# Patient Record
Sex: FEMALE | Race: WHITE | Employment: PART TIME | URBAN - NONMETROPOLITAN AREA
[De-identification: names, ages, dates, MRNs, and addresses within clinical notes are randomized per-mention and may not be internally consistent; named-entity substitution may affect disease eponyms.]

---

## 2018-05-20 ENCOUNTER — OFFICE VISIT (OUTPATIENT)
Dept: PRIMARY CARE CLINIC | Age: 48
End: 2018-05-20
Payer: COMMERCIAL

## 2018-05-20 VITALS
SYSTOLIC BLOOD PRESSURE: 120 MMHG | HEIGHT: 61 IN | WEIGHT: 127 LBS | RESPIRATION RATE: 16 BRPM | OXYGEN SATURATION: 98 % | HEART RATE: 90 BPM | TEMPERATURE: 100.3 F | BODY MASS INDEX: 23.98 KG/M2 | DIASTOLIC BLOOD PRESSURE: 72 MMHG

## 2018-05-20 DIAGNOSIS — J01.40 ACUTE NON-RECURRENT PANSINUSITIS: Primary | ICD-10-CM

## 2018-05-20 PROCEDURE — 99214 OFFICE O/P EST MOD 30 MIN: CPT | Performed by: FAMILY MEDICINE

## 2018-05-20 RX ORDER — AMOXICILLIN AND CLAVULANATE POTASSIUM 500; 125 MG/1; MG/1
1 TABLET, FILM COATED ORAL 2 TIMES DAILY
Qty: 20 TABLET | Refills: 0 | Status: SHIPPED | OUTPATIENT
Start: 2018-05-20 | End: 2018-05-30

## 2018-05-20 ASSESSMENT — ENCOUNTER SYMPTOMS
VOMITING: 0
ABDOMINAL PAIN: 0
SINUS PRESSURE: 1
WHEEZING: 0
DIARRHEA: 0
SHORTNESS OF BREATH: 0
RHINORRHEA: 1
SORE THROAT: 0
SINUS PAIN: 1
NAUSEA: 0
COUGH: 1
CONSTIPATION: 0
EYE DISCHARGE: 0
EYE REDNESS: 0
SWOLLEN GLANDS: 1
TROUBLE SWALLOWING: 0
CHANGE IN BOWEL HABIT: 1

## 2018-05-20 ASSESSMENT — PATIENT HEALTH QUESTIONNAIRE - PHQ9
SUM OF ALL RESPONSES TO PHQ9 QUESTIONS 1 & 2: 0
2. FEELING DOWN, DEPRESSED OR HOPELESS: 0
1. LITTLE INTEREST OR PLEASURE IN DOING THINGS: 0
SUM OF ALL RESPONSES TO PHQ QUESTIONS 1-9: 0

## 2018-12-26 ENCOUNTER — OFFICE VISIT (OUTPATIENT)
Dept: PRIMARY CARE CLINIC | Age: 48
End: 2018-12-26
Payer: COMMERCIAL

## 2018-12-26 VITALS
DIASTOLIC BLOOD PRESSURE: 82 MMHG | TEMPERATURE: 98.3 F | WEIGHT: 127.8 LBS | OXYGEN SATURATION: 99 % | HEART RATE: 76 BPM | BODY MASS INDEX: 24.13 KG/M2 | HEIGHT: 61 IN | RESPIRATION RATE: 16 BRPM | SYSTOLIC BLOOD PRESSURE: 116 MMHG

## 2018-12-26 DIAGNOSIS — J01.40 ACUTE PANSINUSITIS, RECURRENCE NOT SPECIFIED: Primary | ICD-10-CM

## 2018-12-26 PROCEDURE — 99214 OFFICE O/P EST MOD 30 MIN: CPT | Performed by: FAMILY MEDICINE

## 2018-12-26 RX ORDER — AMOXICILLIN 875 MG/1
875 TABLET, COATED ORAL 2 TIMES DAILY
Qty: 20 TABLET | Refills: 0 | Status: SHIPPED | OUTPATIENT
Start: 2018-12-26 | End: 2019-01-05

## 2018-12-26 ASSESSMENT — PATIENT HEALTH QUESTIONNAIRE - PHQ9
SUM OF ALL RESPONSES TO PHQ QUESTIONS 1-9: 0
SUM OF ALL RESPONSES TO PHQ9 QUESTIONS 1 & 2: 0
SUM OF ALL RESPONSES TO PHQ QUESTIONS 1-9: 0
1. LITTLE INTEREST OR PLEASURE IN DOING THINGS: 0
2. FEELING DOWN, DEPRESSED OR HOPELESS: 0

## 2022-06-23 ENCOUNTER — OFFICE VISIT (OUTPATIENT)
Dept: FAMILY MEDICINE CLINIC | Age: 52
End: 2022-06-23
Payer: COMMERCIAL

## 2022-06-23 VITALS
RESPIRATION RATE: 16 BRPM | DIASTOLIC BLOOD PRESSURE: 80 MMHG | HEIGHT: 62 IN | HEART RATE: 72 BPM | OXYGEN SATURATION: 98 % | WEIGHT: 128 LBS | TEMPERATURE: 98.4 F | SYSTOLIC BLOOD PRESSURE: 114 MMHG | BODY MASS INDEX: 23.55 KG/M2

## 2022-06-23 DIAGNOSIS — N93.9 VAGINAL BLEEDING: Primary | ICD-10-CM

## 2022-06-23 DIAGNOSIS — Z12.31 ENCOUNTER FOR SCREENING MAMMOGRAM FOR MALIGNANT NEOPLASM OF BREAST: ICD-10-CM

## 2022-06-23 DIAGNOSIS — Z00.00 WELLNESS EXAMINATION: ICD-10-CM

## 2022-06-23 DIAGNOSIS — Z11.4 ENCOUNTER FOR SCREENING FOR HIV: ICD-10-CM

## 2022-06-23 DIAGNOSIS — Z11.59 ENCOUNTER FOR HEPATITIS C SCREENING TEST FOR LOW RISK PATIENT: ICD-10-CM

## 2022-06-23 DIAGNOSIS — Z12.11 SCREEN FOR COLON CANCER: ICD-10-CM

## 2022-06-23 DIAGNOSIS — B35.4 TINEA CORPORIS: ICD-10-CM

## 2022-06-23 DIAGNOSIS — Z13.220 ENCOUNTER FOR SCREENING FOR LIPOID DISORDERS: ICD-10-CM

## 2022-06-23 PROCEDURE — 99204 OFFICE O/P NEW MOD 45 MIN: CPT | Performed by: FAMILY MEDICINE

## 2022-06-23 RX ORDER — KETOCONAZOLE 20 MG/G
CREAM TOPICAL
Qty: 30 G | Refills: 1 | Status: SHIPPED | OUTPATIENT
Start: 2022-06-23 | End: 2022-10-25 | Stop reason: SDUPTHER

## 2022-06-23 RX ORDER — AMOXICILLIN 500 MG
2 CAPSULE ORAL DAILY
COMMUNITY

## 2022-06-23 SDOH — ECONOMIC STABILITY: FOOD INSECURITY: WITHIN THE PAST 12 MONTHS, THE FOOD YOU BOUGHT JUST DIDN'T LAST AND YOU DIDN'T HAVE MONEY TO GET MORE.: NEVER TRUE

## 2022-06-23 SDOH — ECONOMIC STABILITY: FOOD INSECURITY: WITHIN THE PAST 12 MONTHS, YOU WORRIED THAT YOUR FOOD WOULD RUN OUT BEFORE YOU GOT MONEY TO BUY MORE.: NEVER TRUE

## 2022-06-23 ASSESSMENT — PATIENT HEALTH QUESTIONNAIRE - PHQ9
SUM OF ALL RESPONSES TO PHQ QUESTIONS 1-9: 0
SUM OF ALL RESPONSES TO PHQ9 QUESTIONS 1 & 2: 0
1. LITTLE INTEREST OR PLEASURE IN DOING THINGS: 0
2. FEELING DOWN, DEPRESSED OR HOPELESS: 0
SUM OF ALL RESPONSES TO PHQ QUESTIONS 1-9: 0

## 2022-06-23 ASSESSMENT — ENCOUNTER SYMPTOMS
ABDOMINAL PAIN: 0
CHEST TIGHTNESS: 0
PHOTOPHOBIA: 0
SHORTNESS OF BREATH: 0
WHEEZING: 0
COUGH: 0
BACK PAIN: 0
CHOKING: 0

## 2022-06-23 ASSESSMENT — SOCIAL DETERMINANTS OF HEALTH (SDOH): HOW HARD IS IT FOR YOU TO PAY FOR THE VERY BASICS LIKE FOOD, HOUSING, MEDICAL CARE, AND HEATING?: NOT HARD AT ALL

## 2022-06-23 NOTE — PROGRESS NOTES
Name: Radha Estevez  DOS: 6/24/2022  MRN: [id number]      Subjective:  Radha Estevez is a 47 y.o. female being seen for   Chief Complaint   Patient presents with    New Patient    Establish Care       Vitals:    06/23/22 1559   BP: 114/80   Pulse: 72   Resp: 16   Temp: 98.4 °F (36.9 °C)   SpO2: 98%     Allergies   Allergen Reactions    Gluten Meal Other (See Comments)     Gas and bloating.  Molds & Smuts Other (See Comments)     Watering eyes and stuffy nose.  Mucinex [Guaifenesin Er] Other (See Comments)     Made kidney area tender painful    Pollen Extract Other (See Comments)     Watering eyes and stuffy nose. Past Medical History:   Diagnosis Date    History of UTI 8/29/2015    Other plastic surgery for unacceptable cosmetic appearance 4/7/2014    Bilateral otoplasty and rhinoplasty. Dr. Cheema Police.  Shortness of breath on exertion 2014    Patient states she will \"feel a little dizzy at the top of the stairs. \"    Sinus drainage 07/27/2016    Sinus infection 8/8/2015    per patient    Wears glasses      Past Surgical History:   Procedure Laterality Date    HYSTERECTOMY  July 2012    Vaginal    OTOPLASTY Bilateral 4/7/2014    Bilateral otoplasty and rhinoplasty. Dr. Cheema Police.  OTOPLASTY Right 9/10/2015    Revision of right otoplasty. Dr. Cheema Police.  OTOPLASTY Right 5/2/2016    Second revision of right otoplasty. Dr. Cheema Police.  RHINOPLASTY  04/07/2014    Rhinoplasty and bilateral otoplasty. Dr. Cheema Police. Social History     Socioeconomic History    Marital status:      Spouse name: Not on file    Number of children: Not on file    Years of education: Not on file    Highest education level: Not on file   Occupational History    Not on file   Tobacco Use    Smoking status: Never Smoker    Smokeless tobacco: Never Used   Substance and Sexual Activity    Alcohol use:  Yes     Alcohol/week: 0.0 standard drinks     Comment: \"Occasionally, like at [place] time or on Payne's Day. \"    Drug use: No    Sexual activity: Yes     Partners: Male   Other Topics Concern    Not on file   Social History Narrative    Not on file     Social Determinants of Health     Financial Resource Strain: Low Risk     Difficulty of Paying Living Expenses: Not hard at all   Food Insecurity: No Food Insecurity    Worried About Running Out of Food in the Last Year: Never true    [address] in the Last Year: Never true   Transportation Needs:     Lack of Transportation (Medical): Not on file    Lack of Transportation (Non-Medical): Not on file   Physical Activity:     Days of Exercise per Week: Not on file    Minutes of Exercise per Session: Not on file   Stress:     Feeling of Stress : Not on file   Social Connections:     Frequency of Communication with Friends and Family: Not on file    Frequency of Social Gatherings with Friends and Family: Not on file    Attends [employer] Services: Not on file    Active Member of [address] [place] [address] or Organizations: Not on file    Attends Club or Organization Meetings: Not on file    Marital Status: Not on file   Intimate Partner Violence:     Fear of Current or Ex-Partner: Not on file    Emotionally Abused: Not on file    Physically Abused: Not on file    Sexually Abused: Not on file   Housing Stability:     Unable to Pay for Housing in the Last Year: Not on file    Number of [place] in the Last Year: Not on file    Unstable Housing in the Last Year: Not on file       Current Outpatient Medications   Medication Sig Dispense Refill    Omega-3 Fatty Acids (FISH OIL) 1200 MG CAPS Take 2 mg by mouth daily      ketoconazole (NIZORAL) 2 % cream Apply topically three times a day for 7-10 days 30 g 1    GARLIC PO Take 1 capsule by mouth daily      Multiple Vitamins-Minerals (THERAPEUTIC MULTIVITAMIN-MINERALS) tablet Take 1 tablet by mouth daily \"Doctors' Choice\"      POTASSIUM CHLORIDE ER PO Take 1 capsule by mouth daily.       Ascorbic Acid (VITAMIN C) 500 MG tablet Take 500 mg by mouth daily.  Boswellia Nadia (BOSWELLIA PO) Take 1 capsule by mouth daily DURING WINTER ONLY. (Patient not taking: Reported on 6/23/2022)      fluticasone (FLONASE) 50 MCG/ACT nasal spray 1 spray by Nasal route 2 times daily as needed  (Patient not taking: Reported on 6/23/2022)       No current facility-administered medications for this visit. Objective:  Pt is here with her  to be established. Feels good today. Also she says she she has irritation of her \"belly button\" she places antifungal over the counter helps then comes back. Also upon further questioning she gets very small, light vaginal bleeding\" just after intercourse. Review of Systems   Constitutional: Positive for fatigue (pt gets tired during the day takes naps and gets tired at the end of the day). Negative for unexpected weight change. Eyes: Negative for photophobia and visual disturbance. Respiratory: Negative for cough, choking, chest tightness, shortness of breath and wheezing. Cardiovascular: Negative for chest pain, palpitations and leg swelling. Gastrointestinal: Negative for abdominal pain. Genitourinary: Positive for vaginal bleeding (a little very light blood after intercourse). Negative for difficulty urinating, hematuria, vaginal discharge and vaginal pain. Musculoskeletal: Negative for arthralgias, back pain and myalgias. Skin: Positive for rash (red irritation inside \"belly button\"). Negative for wound. Neurological: Negative for dizziness, tremors, seizures, syncope, speech difficulty, weakness, light-headedness, numbness and headaches. Hematological: Negative for adenopathy. Does not bruise/bleed easily. Psychiatric/Behavioral: Negative for agitation, confusion, decreased concentration, self-injury, sleep disturbance and suicidal ideas. The patient is not nervous/anxious.       Physical Exam  Constitutional:       General: She is not in acute distress. Appearance: Normal appearance. She is not ill-appearing, toxic-appearing or diaphoretic. Comments: Flat affect   HENT:      Head: Normocephalic and atraumatic. Right Ear: Tympanic membrane, ear canal and external ear normal. There is no impacted cerumen. Left Ear: Tympanic membrane, ear canal and external ear normal. There is no impacted cerumen. Nose: Nose normal. No congestion or rhinorrhea. Mouth/Throat:      Mouth: Mucous membranes are moist.      Pharynx: Oropharynx is clear. No oropharyngeal exudate or posterior oropharyngeal erythema. Eyes:      Extraocular Movements: Extraocular movements intact. Conjunctiva/sclera: Conjunctivae normal.      Pupils: Pupils are equal, round, and reactive to light. Cardiovascular:      Rate and Rhythm: Normal rate and regular rhythm. Pulses: Normal pulses. Heart sounds: Normal heart sounds. No murmur heard. Pulmonary:      Effort: Pulmonary effort is normal.      Breath sounds: Normal breath sounds. No wheezing, rhonchi or rales. Abdominal:      General: Abdomen is flat. Bowel sounds are normal. There is no distension. Palpations: Abdomen is soft. There is no mass. Tenderness: There is no abdominal tenderness. There is no right CVA tenderness, left CVA tenderness or guarding. Musculoskeletal:         General: Normal range of motion. Cervical back: Normal range of motion and neck supple. Right lower leg: No edema. Left lower leg: No edema. Lymphadenopathy:      Cervical: No cervical adenopathy. Skin:     General: Skin is warm. Capillary Refill: Capillary refill takes less than 2 seconds. Findings: Rash (on the inside of the umbilicus, no inflammation but erythematous, no discharge, no crust, not hot) present. Neurological:      General: No focal deficit present. Mental Status: She is alert and oriented to person, place, and time. Motor: No weakness. Coordination: Coordination normal.      Gait: Gait normal.   Psychiatric:         Mood and Affect: Mood normal.         Behavior: Behavior normal.         Thought Content: Thought content normal.          Assessment:   Diagnosis Orders   1. Vaginal bleeding     2. Tinea corporis  ketoconazole (NIZORAL) 2 % cream   3. Encounter for screening mammogram for malignant neoplasm of breast  DEBORAH STEPHEN DIGITAL SCREEN BILATERAL   4. Wellness examination  CBC with Auto Differential    Comprehensive Metabolic Panel    Lipid Panel    TSH with Reflex    HIV Screen    Hepatitis C Antibody   5. Screen for colon cancer  Ambulatory referral to General Surgery    Fecal DNA Colorectal cancer screening (Cologuard)   6. Encounter for hepatitis C screening test for low risk patient  Hepatitis C Antibody   7. Encounter for screening for HIV  HIV Screen   8. Encounter for screening for lipoid disorders  Lipid Panel         Plan:  Orders Placed This Encounter   Procedures    Fecal DNA Colorectal cancer screening (Cologuard)    DEBORAH STEPHEN DIGITAL SCREEN BILATERAL     Standing Status:   Future     Standing Expiration Date:   7/23/2022     Order Specific Question:   Reason for exam:     Answer:   Encounter for screening mammogram for malignant neoplasm of breast    CBC with Auto Differential     Standing Status:   Future     Standing Expiration Date:   7/23/2022    Comprehensive Metabolic Panel     Standing Status:   Future     Standing Expiration Date:   7/23/2022    Lipid Panel     Standing Status:   Future     Standing Expiration Date:   7/23/2022     Order Specific Question:   Is Patient Fasting?/# of Hours     Answer:    Yes    TSH with Reflex     Standing Status:   Future     Standing Expiration Date:   7/23/2022    HIV Screen     Standing Status:   Future     Standing Expiration Date:   7/23/2022    Hepatitis C Antibody     Standing Status:   Future     Standing Expiration Date:   7/23/2022    Ambulatory referral to General Surgery Referral Priority:   Routine     Referral Type:   Eval and Treat     Referral Reason:   Specialty Services Required     Referred to Provider:   May Schofield DO     Requested Specialty:   General Surgery     Number of Visits Requested:   1         Patient Instructions   Nutrition Health Education:    Keep hydrated, walk 30 minutes minimum 3 times weekly as tolerated. Diet should consist of low fat, low sodium and high fiber. Nutritious foods such as fruits (if you're not diabetic), vegetables, lean meats, lean dairy, whole grains such as brown rice, quinoa, and dry beans. Channie Alex with small amounts of heart healthy extra virgin olive oil. Be watchful of any extra salt/sugar/seasoning to your food. You should eat no more than 2 grams or 2,000 mg of salt daily. Salt will raise your BP. Avoid regular/diet sodas, caffeine, energy drinks as these are full of artificial ingredients/sweeteners, sugar, salt and chemicals that spike insulin and are harmful to your health. Sugar intake increases metabolic disfunction, type 2 diabetes, insulin resistance, addictive food behavior and obesity. Avoid all processed foods, foods from boxes, cans, microwave meals as these contain high salt, sugar or fat content and not much nutrition. Get at least 8 hrs of sleep every night and turn off all electronics at least 1 hour before bedtime as these decreases melatonin production and increases wakefulness. If your cholesterol is high, no greasy, fried, fast or fatty foods. Decrease red meat intake. No butter, evangelista, lard or creams, no milk as these things clog your arteries and leads to heart attacks and death. If you smoke, smoking increases risk of lung disease, cancers, high BP, heart attack, stroke and death. Take your daily medications as prescribed and inform your family doctor if you are having any side effects or issues taking medications.     Prescribed ketoconazole cream for tinea corporus  Use as directed  If within 2-3 days no improvement or get worse return to office, DERMATOLOGY or ER    Pt will find her own GYN and make and appt as she wants a female GYN I offered to refer her to Dr. Marli Gimenez she said no since he was a male     Refer to general surgery for colonoscopy    Fasting blood work and physical exam within a month    ADDENDUM 06/24/2022 pt does nto want a colonoscopy just want a cologuard       Return in about 1 month (around 7/23/2022) for REVIEW LABS.      Hawa Thomas, DO

## 2022-06-23 NOTE — PATIENT INSTRUCTIONS
Nutrition Health Education:    Keep hydrated, walk 30 minutes minimum 3 times weekly as tolerated. Diet should consist of low fat, low sodium and high fiber. Nutritious foods such as fruits (if you're not diabetic), vegetables, lean meats, lean dairy, whole grains such as brown rice, quinoa, and dry beans. Dank [place]one with small amounts of heart healthy extra virgin olive oil. Be watchful of any extra salt/sugar/seasoning to your food. You should eat no more than 2 grams or 2,000 mg of salt daily. Salt will raise your BP. Avoid regular/diet sodas, caffeine, energy drinks as these are full of artificial ingredients/sweeteners, sugar, salt and chemicals that spike insulin and are harmful to your health. Sugar intake increases metabolic disfunction, type 2 diabetes, insulin resistance, addictive food behavior and obesity. Avoid all processed foods, foods from boxes, cans, microwave meals as these contain high salt, sugar or fat content and not much nutrition. Get at least 8 hrs of sleep every night and turn off all electronics at least 1 hour before bedtime as these decreases melatonin production and increases wakefulness. If your cholesterol is high, no greasy, fried, fast or fatty foods. Decrease red meat intake. No butter, evangelista, lard or creams, no milk as these things clog your arteries and leads to heart attacks and death. If you smoke, smoking increases risk of lung disease, cancers, high BP, heart attack, stroke and death. Take your daily medications as prescribed and inform your family doctor if you are having any side effects or issues taking medications.     Prescribed ketoconazole cream for tinea corporus  Use as directed  If within 2-3 days no improvement or get worse return to office, DERMATOLOGY or ER    Pt will find her own GYN and make and appt as she wants a female GYN I offered to refer her to Dr. Grace Maciel she said no since he was a male     Refer to general surgery for colonoscopy    Fasting blood work and physical exam within a month    ADDENDUM 06/24/2022 pt does nto want a colonoscopy just want a cologuard

## 2022-06-24 ENCOUNTER — TELEPHONE (OUTPATIENT)
Dept: FAMILY MEDICINE CLINIC | Age: 52
End: 2022-06-24

## 2022-06-24 NOTE — TELEPHONE ENCOUNTER
----- Message from Sabina Rodriguez sent at 6/24/2022 10:29 AM EDT -----  Subject: Message to Provider    QUESTIONS  Information for Provider? Patient states does not want colonoscopy but   wants to do a bowel movement test. Please call to discuss. ---------------------------------------------------------------------------  --------------  Stafford Kraigon INFO  What is the best way for the office to contact you? OK to leave message on   voicemail  Preferred Call Back Phone Number? [phone]  ---------------------------------------------------------------------------  --------------  SCRIPT ANSWERS  Relationship to Patient?  Self

## 2022-07-05 LAB
ALBUMIN/GLOBULIN RATIO: 1.5 G/DL
ALBUMIN: 4.9 G/DL (ref 3.5–5)
ALP BLD-CCNC: 96 UNITS/L (ref 38–126)
ALT SERPL-CCNC: 15 UNITS/L (ref 4–35)
ANION GAP SERPL CALCULATED.3IONS-SCNC: 7.2 MMOL/L
AST SERPL-CCNC: 27 UNITS/L (ref 14–36)
BASOPHILS %: 1.53 (ref 0–3)
BASOPHILS ABSOLUTE: 0.06 (ref 0–0.3)
BILIRUB SERPL-MCNC: 0.9 MG/DL (ref 0.2–1.3)
BUN BLDV-MCNC: 10 MG/DL (ref 7–17)
CALCIUM SERPL-MCNC: 9.5 MG/DL (ref 8.4–10.2)
CHLORIDE BLD-SCNC: 101 MMOL/L (ref 98–120)
CHOLESTEROL/HDL RATIO: 4.16 RATIO (ref 0–4.5)
CHOLESTEROL: 233 MG/DL (ref 50–200)
CO2: 30 MMOL/L (ref 22–31)
CREAT SERPL-MCNC: 0.7 MG/DL (ref 0.5–1)
EOSINOPHILS %: 2.23 (ref 0–10)
EOSINOPHILS ABSOLUTE: 0.09 (ref 0–1.1)
GFR CALCULATED: > 60
GLOBULIN: 3.3 G/DL
GLUCOSE: 92 MG/DL (ref 65–105)
HCT VFR BLD CALC: 41.9 % (ref 37–47)
HDLC SERPL-MCNC: 56 MG/DL (ref 36–68)
HEMOGLOBIN: 13.1 (ref 12–16)
HEPATITIS C ANTIBODY: NONREACTIVE
HIV AG/AB: NONREACTIVE
LDL CHOLESTEROL CALCULATED: 157.8 MG/DL (ref 0–160)
LYMPHOCYTE %: 36.78 (ref 20–51.1)
LYMPHOCYTES ABSOLUTE: 1.51 (ref 1–5.5)
MCH RBC QN AUTO: 29.6 PG (ref 28.5–32.5)
MCHC RBC AUTO-ENTMCNC: 31.2 G/DL (ref 32–37)
MCV RBC AUTO: 94.8 FL (ref 80–94)
MONOCYTES %: 7.58 (ref 1.7–9.3)
MONOCYTES ABSOLUTE: 0.31 (ref 0.1–1)
NEUTROPHILS %: 51.88 (ref 42.2–75.2)
NEUTROPHILS ABSOLUTE: 2.13 (ref 2–8.1)
PDW BLD-RTO: 11.7 % (ref 8.5–15.5)
PLATELET # BLD: 259 THOU/MM3 (ref 130–400)
POTASSIUM SERPL-SCNC: 3.7 MMOL/L (ref 3.6–5)
RBC: 4.42 M/UL (ref 4.2–5.4)
SODIUM BLD-SCNC: 138 MMOL/L (ref 135–145)
TOTAL PROTEIN, SERUM: 8.2 G/DL (ref 6.3–8.2)
TRIGL SERPL-MCNC: 96 MG/DL (ref 10–250)
TSH REFLEX FT4: 1.24 MIU/ML (ref 0.49–4.67)
VLDLC SERPL CALC-MCNC: 19 MG/DL (ref 0–50)
WBC: 4.1 THOU/ML3 (ref 4.8–10.8)

## 2022-07-07 LAB — NONINV COLON CA DNA+OCC BLD SCRN STL QL: NEGATIVE

## 2022-07-26 ENCOUNTER — OFFICE VISIT (OUTPATIENT)
Dept: FAMILY MEDICINE CLINIC | Age: 52
End: 2022-07-26
Payer: COMMERCIAL

## 2022-07-26 VITALS
BODY MASS INDEX: 22.26 KG/M2 | TEMPERATURE: 97.2 F | SYSTOLIC BLOOD PRESSURE: 116 MMHG | HEART RATE: 72 BPM | WEIGHT: 121 LBS | DIASTOLIC BLOOD PRESSURE: 86 MMHG | OXYGEN SATURATION: 98 % | RESPIRATION RATE: 16 BRPM | HEIGHT: 62 IN

## 2022-07-26 DIAGNOSIS — E78.2 MIXED HYPERLIPIDEMIA: ICD-10-CM

## 2022-07-26 DIAGNOSIS — Z00.00 WELLNESS EXAMINATION: Primary | ICD-10-CM

## 2022-07-26 PROCEDURE — 99396 PREV VISIT EST AGE 40-64: CPT | Performed by: FAMILY MEDICINE

## 2022-07-26 ASSESSMENT — ENCOUNTER SYMPTOMS
COUGH: 0
CHOKING: 0
ABDOMINAL PAIN: 0
PHOTOPHOBIA: 0
WHEEZING: 0
DIARRHEA: 0
BLOOD IN STOOL: 0
NAUSEA: 0
SHORTNESS OF BREATH: 0
BACK PAIN: 0
VOMITING: 0
CONSTIPATION: 0
CHEST TIGHTNESS: 0

## 2022-07-26 ASSESSMENT — PATIENT HEALTH QUESTIONNAIRE - PHQ9
SUM OF ALL RESPONSES TO PHQ QUESTIONS 1-9: 0
2. FEELING DOWN, DEPRESSED OR HOPELESS: 0
SUM OF ALL RESPONSES TO PHQ QUESTIONS 1-9: 0
SUM OF ALL RESPONSES TO PHQ9 QUESTIONS 1 & 2: 0
1. LITTLE INTEREST OR PLEASURE IN DOING THINGS: 0

## 2022-07-26 NOTE — PATIENT INSTRUCTIONS
Nutrition Health Education:    Keep hydrated, walk 30 minutes minimum 3 times weekly as tolerated. Diet should consist of low fat, low sodium and high fiber. Nutritious foods such as fruits (if you're not diabetic), vegetables, lean meats, lean dairy, whole grains such as brown rice, quinoa, and dry beans. Adonica Bryan with small amounts of heart healthy extra virgin olive oil. Be watchful of any extra salt/sugar/seasoning to your food. You should eat no more than 2 grams or 2,000 mg of salt daily. Salt will raise your BP. Avoid regular/diet sodas, caffeine, energy drinks as these are full of artificial ingredients/sweeteners, sugar, salt and chemicals that spike insulin and are harmful to your health. Sugar intake increases metabolic disfunction, type 2 diabetes, insulin resistance, addictive food behavior and obesity. Avoid all processed foods, foods from boxes, cans, microwave meals as these contain high salt, sugar or fat content and not much nutrition. Get at least 8 hrs of sleep every night and turn off all electronics at least 1 hour before bedtime as these decreases melatonin production and increases wakefulness. If your cholesterol is high, no greasy, fried, fast or fatty foods. Decrease red meat intake. No butter, evangelista, lard or creams, no milk as these things clog your arteries and leads to heart attacks and death. If you smoke, smoking increases risk of lung disease, cancers, high BP, heart attack, stroke and death. Take your daily medications as prescribed and inform your family doctor if you are having any side effects or issues taking medications.      Elevated Cholesterol:  No greasy, fried, fast, fatty foods  No trans fats  Decreased red meat intake to once every 2 months  No butter, evangelista nor cream cheese, cheese  No egg yolk  NO milk  Decrease your cholesterol in diet    Reviewed blood work with pt cbc,cmp,lipids,tsh,hiv,hepc,cologuard negative    Pt wants to try a low cholesterol diet before starting medication     Fasting blood work and follow up in 3 months

## 2022-07-26 NOTE — PROGRESS NOTES
Name: Sarah Huber  DOS: 7/26/2022  MRN: [id number]      Subjective:  Sarah Huber is a 47 y.o. female being seen for   Chief Complaint   Patient presents with    Follow-up     1 month follow up labs completed 07/05/2022       Vitals:    07/26/22 1514   BP: 116/86   Pulse: 72   Resp: 16   Temp: 97.2 °F (36.2 °C)   SpO2: 98%     Allergies   Allergen Reactions    Gluten Meal Other (See Comments)     Gas and bloating. Molds & Smuts Other (See Comments)     Watering eyes and stuffy nose. Mucinex [Guaifenesin Er] Other (See Comments)     Made kidney area tender painful    Pollen Extract Other (See Comments)     Watering eyes and stuffy nose. Past Medical History:   Diagnosis Date    History of UTI 8/29/2015    Other plastic surgery for unacceptable cosmetic appearance 4/7/2014    Bilateral otoplasty and rhinoplasty. Dr. Shonna Frausto. Shortness of breath on exertion 2014    Patient states she will \"feel a little dizzy at the top of the stairs. \"    Sinus drainage 07/27/2016    Sinus infection 8/8/2015    per patient    Wears glasses      Past Surgical History:   Procedure Laterality Date    HYSTERECTOMY (CERVIX STATUS UNKNOWN)  July 2012    Vaginal    OTOPLASTY Bilateral 4/7/2014    Bilateral otoplasty and rhinoplasty. Dr. Shonna Frausto. OTOPLASTY Right 9/10/2015    Revision of right otoplasty. Dr. Shonna Frausto. OTOPLASTY Right 5/2/2016    Second revision of right otoplasty. Dr. Shonna Frausto. RHINOPLASTY  04/07/2014    Rhinoplasty and bilateral otoplasty. Dr. Shonna Frausto. Social History     Socioeconomic History    Marital status:      Spouse name: None    Number of children: None    Years of education: None    Highest education level: None   Tobacco Use    Smoking status: Never    Smokeless tobacco: Never   Substance and Sexual Activity    Alcohol use: Yes     Alcohol/week: 0.0 standard drinks     Comment: \"Occasionally, like at Benigno time or on Payne's Day. \"    Drug use: No    Sexual activity: Yes     Partners: Male     Social Determinants of Health     Financial Resource Strain: Low Risk     Difficulty of Paying Living Expenses: Not hard at all   Food Insecurity: No Food Insecurity    Worried About [address] in the Last Year: Never true    Ran Out of Food in the Last Year: Never true       Current Outpatient Medications   Medication Sig Dispense Refill    Omega-3 Fatty Acids (FISH OIL) 1200 MG CAPS Take 2 mg by mouth daily      ketoconazole (NIZORAL) 2 % cream Apply topically three times a day for 7-10 days 30 g 1    GARLIC PO Take 1 capsule by mouth daily      Boswellia Nadia (BOSWELLIA PO) Take 1 capsule by mouth daily DURING WINTER ONLY. Multiple Vitamins-Minerals (THERAPEUTIC MULTIVITAMIN-MINERALS) tablet Take 1 tablet by mouth daily \"Doctors' Choice\"      fluticasone (FLONASE) 50 MCG/ACT nasal spray 1 spray by Nasal route 2 times daily as needed      POTASSIUM CHLORIDE ER PO Take 1 capsule by mouth daily. Ascorbic Acid (VITAMIN C) 500 MG tablet Take 500 mg by mouth daily. No current facility-administered medications for this visit. Objective:  Pt is with her  for wellness exam. Pt feels good today. Review of Systems   Constitutional:  Negative for fatigue and unexpected weight change. Eyes:  Negative for photophobia and visual disturbance. Respiratory:  Negative for cough, choking, chest tightness, shortness of breath and wheezing. Cardiovascular:  Negative for chest pain, palpitations and leg swelling. Gastrointestinal:  Negative for abdominal pain, blood in stool, constipation, diarrhea, nausea and vomiting. Genitourinary:  Negative for difficulty urinating, hematuria, vaginal bleeding, vaginal discharge and vaginal pain. Musculoskeletal:  Negative for arthralgias, back pain, gait problem, myalgias, neck pain and neck stiffness. Skin:  Negative for rash and wound.    Neurological:  Negative for dizziness, tremors, seizures, syncope, speech difficulty, weakness, light-headedness, numbness and headaches. Hematological:  Negative for adenopathy. Does not bruise/bleed easily. Psychiatric/Behavioral:  Negative for agitation, confusion, decreased concentration and suicidal ideas. Physical Exam  Constitutional:       General: She is not in acute distress. Appearance: Normal appearance. She is not ill-appearing, toxic-appearing or diaphoretic. HENT:      Head: Normocephalic and atraumatic. Right Ear: Tympanic membrane, ear canal and external ear normal. There is no impacted cerumen. Left Ear: Tympanic membrane, ear canal and external ear normal. There is no impacted cerumen. Nose: Nose normal. No congestion or rhinorrhea. Mouth/Throat:      Mouth: Mucous membranes are moist.      Pharynx: Oropharynx is clear. No oropharyngeal exudate or posterior oropharyngeal erythema. Eyes:      Extraocular Movements: Extraocular movements intact. Conjunctiva/sclera: Conjunctivae normal.      Pupils: Pupils are equal, round, and reactive to light. Cardiovascular:      Rate and Rhythm: Normal rate and regular rhythm. Pulses: Normal pulses. Heart sounds: Normal heart sounds. No murmur heard. Pulmonary:      Effort: Pulmonary effort is normal.      Breath sounds: Normal breath sounds. No wheezing, rhonchi or rales. Abdominal:      General: Abdomen is flat. Bowel sounds are normal. There is no distension. Palpations: Abdomen is soft. There is no mass. Tenderness: There is no abdominal tenderness. There is no right CVA tenderness, left CVA tenderness or guarding. Musculoskeletal:         General: Normal range of motion. Cervical back: Normal range of motion and neck supple. Right lower leg: No edema. Left lower leg: No edema. Lymphadenopathy:      Cervical: No cervical adenopathy. Skin:     General: Skin is warm. Capillary Refill: Capillary refill takes less than 2 seconds. Neurological:      General: No focal deficit present. Mental Status: She is alert and oriented to person, place, and time. Motor: No weakness. Coordination: Coordination normal.      Gait: Gait normal.   Psychiatric:         Mood and Affect: Mood normal.         Behavior: Behavior normal.         Thought Content: Thought content normal.        Assessment:   Diagnosis Orders   1. Wellness examination        2. Mixed hyperlipidemia  ALT    AST    Lipid Panel            Plan:  Orders Placed This Encounter   Procedures    ALT     Standing Status:   Future     Standing Expiration Date:   11/26/2022    AST     Standing Status:   Future     Standing Expiration Date:   11/26/2022    Lipid Panel     Standing Status:   Future     Standing Expiration Date:   11/26/2022     Order Specific Question:   Is Patient Fasting?/# of Hours     Answer:   Yes           Patient Instructions   Nutrition Health Education:    Keep hydrated, walk 30 minutes minimum 3 times weekly as tolerated. Diet should consist of low fat, low sodium and high fiber. Nutritious foods such as fruits (if you're not diabetic), vegetables, lean meats, lean dairy, whole grains such as brown rice, quinoa, and dry beans. Mardell Dust with small amounts of heart healthy extra virgin olive oil. Be watchful of any extra salt/sugar/seasoning to your food. You should eat no more than 2 grams or 2,000 mg of salt daily. Salt will raise your BP. Avoid regular/diet sodas, caffeine, energy drinks as these are full of artificial ingredients/sweeteners, sugar, salt and chemicals that spike insulin and are harmful to your health. Sugar intake increases metabolic disfunction, type 2 diabetes, insulin resistance, addictive food behavior and obesity. Avoid all processed foods, foods from boxes, cans, microwave meals as these contain high salt, sugar or fat content and not much nutrition.  Get at least 8 hrs of sleep every night and turn off all electronics at least 1 hour before bedtime as these decreases melatonin production and increases wakefulness. If your cholesterol is high, no greasy, fried, fast or fatty foods. Decrease red meat intake. No butter, evangelista, lard or creams, no milk as these things clog your arteries and leads to heart attacks and death. If you smoke, smoking increases risk of lung disease, cancers, high BP, heart attack, stroke and death. Take your daily medications as prescribed and inform your family doctor if you are having any side effects or issues taking medications. Elevated Cholesterol:  No greasy, fried, fast, fatty foods  No trans fats  Decreased red meat intake to once every 2 months  No butter, evangelista nor cream cheese, cheese  No egg yolk  NO milk  Decrease your cholesterol in diet    Reviewed blood work with pt cbc,cmp,lipids,tsh,hiv,hepc,cologuard negative    Pt wants to try a low cholesterol diet before starting medication     Fasting blood work and follow up in 3 months     No follow-ups on file.      Jose E Vicente, DO

## 2022-08-08 ENCOUNTER — OFFICE VISIT (OUTPATIENT)
Dept: OBGYN | Age: 52
End: 2022-08-08
Payer: COMMERCIAL

## 2022-08-08 VITALS
SYSTOLIC BLOOD PRESSURE: 116 MMHG | OXYGEN SATURATION: 98 % | HEIGHT: 61 IN | DIASTOLIC BLOOD PRESSURE: 70 MMHG | HEART RATE: 86 BPM | BODY MASS INDEX: 22.09 KG/M2 | WEIGHT: 117 LBS

## 2022-08-08 DIAGNOSIS — Z01.419 WELL WOMAN EXAM WITH ROUTINE GYNECOLOGICAL EXAM: Primary | ICD-10-CM

## 2022-08-08 PROCEDURE — 99386 PREV VISIT NEW AGE 40-64: CPT | Performed by: NURSE PRACTITIONER

## 2022-08-08 ASSESSMENT — PATIENT HEALTH QUESTIONNAIRE - PHQ9
SUM OF ALL RESPONSES TO PHQ QUESTIONS 1-9: 0
1. LITTLE INTEREST OR PLEASURE IN DOING THINGS: 0
2. FEELING DOWN, DEPRESSED OR HOPELESS: 0
SUM OF ALL RESPONSES TO PHQ QUESTIONS 1-9: 0
SUM OF ALL RESPONSES TO PHQ9 QUESTIONS 1 & 2: 0

## 2022-08-08 ASSESSMENT — ENCOUNTER SYMPTOMS
GASTROINTESTINAL NEGATIVE: 1
RESPIRATORY NEGATIVE: 1
EYES NEGATIVE: 1
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
## 2022-10-25 ENCOUNTER — HOSPITAL ENCOUNTER (OUTPATIENT)
Age: 52
Setting detail: SPECIMEN
Discharge: HOME | End: 2022-10-25
Payer: COMMERCIAL

## 2022-10-25 ENCOUNTER — OFFICE VISIT (OUTPATIENT)
Dept: FAMILY MEDICINE CLINIC | Age: 52
End: 2022-10-25
Payer: COMMERCIAL

## 2022-10-25 VITALS
WEIGHT: 120 LBS | BODY MASS INDEX: 22.08 KG/M2 | SYSTOLIC BLOOD PRESSURE: 115 MMHG | HEART RATE: 57 BPM | OXYGEN SATURATION: 100 % | RESPIRATION RATE: 14 BRPM | HEIGHT: 62 IN | TEMPERATURE: 98.8 F | DIASTOLIC BLOOD PRESSURE: 80 MMHG

## 2022-10-25 DIAGNOSIS — B35.4 TINEA CORPORIS: ICD-10-CM

## 2022-10-25 DIAGNOSIS — R30.0 DYSURIA: ICD-10-CM

## 2022-10-25 DIAGNOSIS — N30.91 CYSTITIS WITH HEMATURIA: Primary | ICD-10-CM

## 2022-10-25 DIAGNOSIS — N30.91 CYSTITIS WITH HEMATURIA: ICD-10-CM

## 2022-10-25 LAB
BILIRUBIN, POC: ABNORMAL
BLOOD URINE, POC: ABNORMAL
CLARITY, POC: ABNORMAL
COLOR, POC: ABNORMAL
GLUCOSE URINE, POC: ABNORMAL
KETONES, POC: ABNORMAL
LEUKOCYTE EST, POC: ABNORMAL
NITRITE, POC: ABNORMAL
PH, POC: 7.5
PROTEIN, POC: ABNORMAL
SPECIFIC GRAVITY, POC: ABNORMAL
UROBILINOGEN, POC: 0.2

## 2022-10-25 PROCEDURE — 87086 URINE CULTURE/COLONY COUNT: CPT

## 2022-10-25 PROCEDURE — 99213 OFFICE O/P EST LOW 20 MIN: CPT | Performed by: NURSE PRACTITIONER

## 2022-10-25 PROCEDURE — 81002 URINALYSIS NONAUTO W/O SCOPE: CPT | Performed by: NURSE PRACTITIONER

## 2022-10-25 RX ORDER — CEPHALEXIN 500 MG/1
500 CAPSULE ORAL 3 TIMES DAILY
Qty: 15 CAPSULE | Refills: 0 | Status: SHIPPED | OUTPATIENT
Start: 2022-10-25 | End: 2022-10-30

## 2022-10-25 RX ORDER — KETOCONAZOLE 20 MG/G
CREAM TOPICAL
Qty: 30 G | Refills: 1 | Status: SHIPPED | OUTPATIENT
Start: 2022-10-25

## 2022-10-25 NOTE — PROGRESS NOTES
[address] [place] [address] In [address] [facility], APRN-[facility]  [address]  Phone:  [phone]  Fax:  [phone]  Juan Boyce is a 47 y.o. female who presents today for her medical conditions/complaints as noted below. Juan Boyce c/o of Hematuria (Pt presents to the walk in having some pinching sensation after urinating starting on Saturday. She notice blood in her urine this morning 10/25/22. Since Saturday she has been treating it with over the counter Cystex and urinary pain relief medications. She also has been drinking apple  vinegar to try to help. )      HPI:     Hematuria  This is a new problem. The current episode started in the past 7 days. The problem is unchanged. She describes the hematuria as gross hematuria. The hematuria occurs during the terminal portion of her urinary stream. She reports no clotting in her urine stream. Her pain is at a severity of 3/10. She describes her urine color as light pink. Irritative symptoms include frequency and urgency. Wt Readings from Last 3 Encounters:   10/25/22 120 lb (54.4 kg)   08/08/22 117 lb (53.1 kg)   07/26/22 121 lb (54.9 kg)       Temp Readings from Last 3 Encounters:   10/25/22 98.8 °F (37.1 °C)   07/26/22 97.2 °F (36.2 °C) (Tympanic)   06/23/22 98.4 °F (36.9 °C)       BP Readings from Last 3 Encounters:   10/25/22 115/80   08/08/22 116/70   07/26/22 116/86       Pulse Readings from Last 3 Encounters:   10/25/22 57   08/08/22 86   07/26/22 72        SpO2 Readings from Last 3 Encounters:   10/25/22 100%   08/08/22 98%   07/26/22 98%             Past Medical History:   Diagnosis Date    History of UTI 08/29/2015    Other plastic surgery for unacceptable cosmetic appearance 04/07/2014    Bilateral otoplasty and rhinoplasty. Dr. Diana Shukla. Shortness of breath on exertion 2014    Patient states she will \"feel a little dizzy at the top of the stairs. \"    Sinus drainage 07/27/2016    Sinus infection 08/08/2015 per patient    Wears glasses       Past Surgical History:   Procedure Laterality Date    HYSTERECTOMY (CERVIX STATUS UNKNOWN)  07/01/2012    Vaginal    OTOPLASTY Bilateral 04/07/2014    Bilateral otoplasty and rhinoplasty. Dr. Joe Shah. OTOPLASTY Right 09/10/2015    Revision of right otoplasty. Dr. Joe Shah. OTOPLASTY Right 05/02/2016    Second revision of right otoplasty. Dr. Joe Shah. RHINOPLASTY  04/07/2014    Rhinoplasty and bilateral otoplasty. Dr. Joe Shah. History reviewed. No pertinent family history. Social History     Tobacco Use    Smoking status: Never    Smokeless tobacco: Never   Substance Use Topics    Alcohol use: Yes     Alcohol/week: 0.0 standard drinks     Comment: \"Occasionally, like at Benigno time or on Valentine's Day. \"      Current Outpatient Medications   Medication Sig Dispense Refill    Omega-3 Fatty Acids (FISH OIL) 1200 MG CAPS Take 2 mg by mouth daily      ketoconazole (NIZORAL) 2 % cream Apply topically three times a day for 7-10 days 30 g 1    GARLIC PO Take 1 capsule by mouth daily      Multiple Vitamins-Minerals (THERAPEUTIC MULTIVITAMIN-MINERALS) tablet Take 1 tablet by mouth daily \"Doctors' Choice\"      Ascorbic Acid (VITAMIN C) 500 MG tablet Take 500 mg by mouth daily. No current facility-administered medications for this visit. Allergies   Allergen Reactions    Gluten Meal Other (See Comments)     Gas and bloating. Molds & Smuts Other (See Comments)     Watering eyes and stuffy nose. Mucinex [Guaifenesin Er] Other (See Comments)     Made kidney area tender painful    Pollen Extract Other (See Comments)     Watering eyes and stuffy nose. No results found. Subjective:      Review of Systems   Genitourinary:  Positive for frequency, hematuria and urgency.      Objective:     /80 (Site: Left Upper Arm, Position: Sitting, Cuff Size: Medium Adult)   Pulse 57   Temp 98.8 °F (37.1 °C)   Resp 14   Ht 5' 2\" (1.575 m)   Wt 120 lb (54.4 kg)   LMP 08/29/2014   SpO2 100%   BMI 21.95 kg/m²     Physical Exam  Vitals and nursing note reviewed. Constitutional:       Appearance: Normal appearance. Pulmonary:      Effort: Pulmonary effort is normal.   Abdominal:      Tenderness: There is abdominal tenderness in the suprapubic area. There is no right CVA tenderness or left CVA tenderness. Skin:     General: Skin is warm and dry. Capillary Refill: Capillary refill takes less than 2 seconds. Neurological:      General: No focal deficit present. Mental Status: She is alert and oriented to person, place, and time. Assessment:      Diagnosis Orders   1. Cystitis with hematuria  Culture, Urine      2. Dysuria  POCT Urinalysis no Micro    POCT Urinalysis no Micro        Results for POC orders placed in visit on 10/25/22   POCT Urinalysis no Micro   Result Value Ref Range    Color, UA      Clarity, UA      Glucose, UA POC neg     Bilirubin, UA neg     Ketones, UA neg     Spec Grav, UA      Blood, UA POC 2+     pH, UA 7.5     Protein, UA POC neg     Urobilinogen, UA 0.2     Leukocytes, UA 1+     Nitrite, UA neg                Plan:       Keflex as directed. Patient will be contacted upon receipt of final culture and sensitivity. Any additions or changes to medications or the plan of care will be made at that time. Push oral fluids. Follow up with primary care provider in 1 to 2 days if needed. May continue to use the AZO for pain and discomfort. There are no Patient Instructions on file for this visit. Patient/Caregiver instructed on use, benefit, and side effects of prescribed medications. All patient/parent/caregiver questions answered. Patient/parent/caregiver voiced understanding. Reviewed health maintenance. Instructed to continue current medications, diet and exercise. Patient agreed with treatment plan. Follow up as directed.            Electronically signed by SONIA Berumen NP on10/25/2022

## 2022-10-25 NOTE — PATIENT INSTRUCTIONS
Keflex as directed. Patient will be contacted upon receipt of final culture and sensitivity. Any additions or changes to medications or the plan of care will be made at that time. Push oral fluids. Follow up with primary care provider in 1 to 2 days if needed.

## 2022-10-26 LAB
CULTURE: NORMAL
SPECIMEN DESCRIPTION: NORMAL

## 2022-10-27 NOTE — RESULT ENCOUNTER NOTE
Reviewed results with patient. She states she is feeling much better. I recommended she return for additional testing if she sees any blood again.

## 2023-11-08 ENCOUNTER — OFFICE VISIT (OUTPATIENT)
Dept: FAMILY MEDICINE CLINIC | Age: 53
End: 2023-11-08
Payer: COMMERCIAL

## 2023-11-08 VITALS
HEIGHT: 62 IN | TEMPERATURE: 98 F | BODY MASS INDEX: 24.4 KG/M2 | SYSTOLIC BLOOD PRESSURE: 110 MMHG | OXYGEN SATURATION: 98 % | DIASTOLIC BLOOD PRESSURE: 76 MMHG | WEIGHT: 132.6 LBS | HEART RATE: 73 BPM

## 2023-11-08 DIAGNOSIS — B96.89 ACUTE BACTERIAL SINUSITIS: Primary | ICD-10-CM

## 2023-11-08 DIAGNOSIS — J01.90 ACUTE BACTERIAL SINUSITIS: Primary | ICD-10-CM

## 2023-11-08 PROCEDURE — 99213 OFFICE O/P EST LOW 20 MIN: CPT | Performed by: NURSE PRACTITIONER

## 2023-11-08 RX ORDER — AMOXICILLIN AND CLAVULANATE POTASSIUM 875; 125 MG/1; MG/1
1 TABLET, FILM COATED ORAL 2 TIMES DAILY
Qty: 20 TABLET | Refills: 0 | Status: SHIPPED | OUTPATIENT
Start: 2023-11-08 | End: 2023-11-18

## 2023-11-08 RX ORDER — FLUTICASONE PROPIONATE 50 MCG
1 SPRAY, SUSPENSION (ML) NASAL 2 TIMES DAILY
Qty: 16 G | Refills: 0 | Status: SHIPPED | OUTPATIENT
Start: 2023-11-08

## 2023-11-08 ASSESSMENT — ENCOUNTER SYMPTOMS
FACIAL SWELLING: 0
ABDOMINAL PAIN: 0
DIARRHEA: 1
VOICE CHANGE: 0
SINUS PAIN: 1
COUGH: 1
VOMITING: 0
NAUSEA: 0
SHORTNESS OF BREATH: 0
TROUBLE SWALLOWING: 0
RHINORRHEA: 1
BACK PAIN: 1
SINUS PRESSURE: 1

## 2023-11-08 NOTE — PROGRESS NOTES
[facility] department of [facility]  [address] [address]  Phone: [phone]  Fax: [phone]      Ernestine Riedel is a 46 y.o. female who presents to the [address] [place] [address] today for her medical conditions/complaints as noted below. Ernestine Riedel is c/o of Illness (Sinus issues started about 2 weeks ago. Congestion in head, started around nasal cavities, now feels like it is all over her face. States that her bowels are starting to get loose as well.)      Assessment and Plan     1. Acute bacterial sinusitis  - amoxicillin-clavulanate (AUGMENTIN) 875-125 MG per tablet; Take 1 tablet by mouth 2 times daily for 10 days  Dispense: 20 tablet; Refill: 0  - fluticasone (FLONASE) 50 MCG/ACT nasal spray; 1 spray by Each Nostril route in the morning and 1 spray in the evening. Dispense: 16 g; Refill: 0      Will treat for bacterial sinus infection based on duration of symptoms and assessment. Advised on supportive therapies and use of probiotics to help with diarrhea. Discussed that she should be free from fever, vomiting and diarrhea for at least 24 hours prior to returning to work. Encouraged to follow up as needed. Recommended over the counter medications/treatments: The use of an antihistamine (Claritin or Zyrtec) may help with sinus congestion and drainage. A nasal steroid spray (Flonase or Nasacort) should be used to help decrease swelling and irritation in the sinuses to reduce congestion and drainage. Mucinex (12 hour) will also help to thin mucus so that it drains easier and improves congestion. Works best if you are drinking plenty of water. Honey with or without Lemon may also help with coughing. Alternating hot liquids with cold liquids helps to soothe a sore throat. Use Acetaminophen (Tylenol) to help relieve fever, chills or body aches. If allowed, you may alternate using ibuprofen (Motrin) and Tylenol.  Do not

## 2023-11-29 ENCOUNTER — OFFICE VISIT (OUTPATIENT)
Dept: FAMILY MEDICINE CLINIC | Age: 53
End: 2023-11-29
Payer: COMMERCIAL

## 2023-11-29 VITALS
RESPIRATION RATE: 16 BRPM | SYSTOLIC BLOOD PRESSURE: 114 MMHG | WEIGHT: 133.8 LBS | HEIGHT: 61 IN | OXYGEN SATURATION: 98 % | TEMPERATURE: 97.1 F | HEART RATE: 74 BPM | DIASTOLIC BLOOD PRESSURE: 62 MMHG | BODY MASS INDEX: 25.26 KG/M2

## 2023-11-29 DIAGNOSIS — J32.0 LEFT MAXILLARY SINUSITIS: Primary | ICD-10-CM

## 2023-11-29 PROCEDURE — 99213 OFFICE O/P EST LOW 20 MIN: CPT | Performed by: NURSE PRACTITIONER

## 2023-11-29 RX ORDER — CEFDINIR 300 MG/1
300 CAPSULE ORAL 2 TIMES DAILY
Qty: 20 CAPSULE | Refills: 0 | Status: SHIPPED | OUTPATIENT
Start: 2023-11-29 | End: 2023-12-09

## 2023-11-29 SDOH — ECONOMIC STABILITY: INCOME INSECURITY: HOW HARD IS IT FOR YOU TO PAY FOR THE VERY BASICS LIKE FOOD, HOUSING, MEDICAL CARE, AND HEATING?: NOT HARD AT ALL

## 2023-11-29 SDOH — ECONOMIC STABILITY: FOOD INSECURITY: WITHIN THE PAST 12 MONTHS, THE FOOD YOU BOUGHT JUST DIDN'T LAST AND YOU DIDN'T HAVE MONEY TO GET MORE.: NEVER TRUE

## 2023-11-29 SDOH — ECONOMIC STABILITY: FOOD INSECURITY: WITHIN THE PAST 12 MONTHS, YOU WORRIED THAT YOUR FOOD WOULD RUN OUT BEFORE YOU GOT MONEY TO BUY MORE.: NEVER TRUE

## 2023-11-29 SDOH — ECONOMIC STABILITY: HOUSING INSECURITY
IN THE LAST 12 MONTHS, WAS THERE A TIME WHEN YOU DID NOT HAVE A STEADY PLACE TO SLEEP OR SLEPT IN A SHELTER (INCLUDING NOW)?: NO

## 2023-11-29 ASSESSMENT — PATIENT HEALTH QUESTIONNAIRE - PHQ9
SUM OF ALL RESPONSES TO PHQ QUESTIONS 1-9: 0
SUM OF ALL RESPONSES TO PHQ QUESTIONS 1-9: 0
2. FEELING DOWN, DEPRESSED OR HOPELESS: 0
SUM OF ALL RESPONSES TO PHQ QUESTIONS 1-9: 0
1. LITTLE INTEREST OR PLEASURE IN DOING THINGS: 0
SUM OF ALL RESPONSES TO PHQ QUESTIONS 1-9: 0
SUM OF ALL RESPONSES TO PHQ9 QUESTIONS 1 & 2: 0

## 2023-11-29 NOTE — PATIENT INSTRUCTIONS
Omnicef as directed. Continue the flonase. How do you use a Nasal Corticosteroid Spray? Make sure you understand your dosing instructions. Spray only the number of prescribed sprays in each nostril. Read the package instructions before using your spray the first time. Most corticosteroid sprays suggest the following steps:  Wash your hands well. Gently blow your nose to clear the passageway. Shake the container several times. Keep your head upright. DO NOT tilt your head back. Breathe out. Block one nostril with your finger. Insert the nasal applicator into the other nostril. Aim the spray toward the outer wall of the nostril. Inhale slowly through the nose and press the . Breathe out and repeat to apply the prescribed number of sprays. Repeat these steps for the other nostril. Avoid sneezing or blowing your nose right after spraying. Do not inhale so deeply you pull the medication into the throat.

## 2023-11-29 NOTE — PROGRESS NOTES
Lulú Rodriguez, APRN-[facility]  [address], [facility]  [phone]        11/29/2023     Tom Garrison is a 46 y.o. female, here for evaluation of the following medical concerns:    Chief Complaint   Patient presents with    Sinusitis     Follow up- seen 11/08/2023 given Augmentin 875 and Flonase. She is still having sinus pain. She had dull pain in back for one week after taking abx that subsided. Sinusitis  This is a recurrent problem. The current episode started in the past 7 days. The problem has been gradually worsening since onset. There has been no fever. Her pain is at a severity of 5/10. Associated symptoms include congestion and sinus pressure. Pertinent negatives include no chills, coughing or sore throat. Past treatments include antibiotics (flonase). The treatment provided moderate relief. The sinus infection seemed to go away with treatment. She finished her Augmentin on 11/18. Pain started back after Thanksgiving and today it is more severe. 5 of 10 on pain scale. She took an ibuprofen which helped some. Left side nasal drainage was bloody. Right side was clear. Patient Active Problem List   Diagnosis    Other plastic surgery for unacceptable cosmetic appearance    Sinus infection    Encounter for cosmetic surgery    Venous insufficiency       Patient's recent lab reports are as follows:      Results for orders placed or performed during the hospital encounter of 10/25/22   Culture, Urine    Specimen: Urine, clean catch   Result Value Ref Range    Specimen Description . CLEAN CATCH URINE     Culture NO SIGNIFICANT GROWTH      Other review of systems are as noted below. Preventative measures are reviewed today. See health maintenance section for complete preventative plan of care. Did review patient's med list, allergies, social history, fam history, pmhx and pshx today as noted in the record.       Review of Systems

## 2023-12-06 ASSESSMENT — ENCOUNTER SYMPTOMS
COUGH: 0
SINUS PRESSURE: 1
SORE THROAT: 0
SINUS PAIN: 1

## 2024-02-06 ENCOUNTER — OFFICE VISIT (OUTPATIENT)
Dept: FAMILY MEDICINE CLINIC | Age: 54
End: 2024-02-06
Payer: COMMERCIAL

## 2024-02-06 ENCOUNTER — HOSPITAL ENCOUNTER (OUTPATIENT)
Age: 54
Setting detail: SPECIMEN
Discharge: HOME | End: 2024-02-06
Payer: COMMERCIAL

## 2024-02-06 VITALS
WEIGHT: 135 LBS | DIASTOLIC BLOOD PRESSURE: 70 MMHG | OXYGEN SATURATION: 98 % | SYSTOLIC BLOOD PRESSURE: 122 MMHG | HEIGHT: 61 IN | TEMPERATURE: 98.3 F | HEART RATE: 81 BPM | RESPIRATION RATE: 16 BRPM | BODY MASS INDEX: 25.49 KG/M2

## 2024-02-06 DIAGNOSIS — Z00.00 ENCOUNTER FOR WELL ADULT EXAM WITHOUT ABNORMAL FINDINGS: Primary | ICD-10-CM

## 2024-02-06 DIAGNOSIS — R10.9 LEFT FLANK PAIN: ICD-10-CM

## 2024-02-06 DIAGNOSIS — Z12.31 ENCOUNTER FOR SCREENING MAMMOGRAM FOR MALIGNANT NEOPLASM OF BREAST: ICD-10-CM

## 2024-02-06 LAB
ALBUMIN/GLOBULIN RATIO: 1.8 G/DL
ALBUMIN: 5 G/DL (ref 3.5–5)
ALP BLD-CCNC: 107 UNITS/L (ref 38–126)
ALT SERPL-CCNC: 17 UNITS/L (ref 4–35)
ANION GAP SERPL CALCULATED.3IONS-SCNC: 8.9 MMOL/L (ref 3–11)
AST SERPL-CCNC: 29 UNITS/L (ref 14–36)
BACTERIA URNS QL MICRO: NORMAL
BASOPHILS %: 1.76 (ref 0–3)
BASOPHILS ABSOLUTE: 0.07 (ref 0–0.3)
BILIRUB SERPL-MCNC: 0.8 MG/DL (ref 0.2–1.3)
BUN BLDV-MCNC: 13 MG/DL (ref 7–17)
CALCIUM SERPL-MCNC: 10 MG/DL (ref 8.4–10.2)
CHLORIDE BLD-SCNC: 106 MMOL/L (ref 98–120)
CHOLESTEROL/HDL RATIO: 2.49 RATIO (ref 0–4.5)
CHOLESTEROL: 242 MG/DL (ref 50–200)
CO2: 28 MMOL/L (ref 22–31)
CREAT SERPL-MCNC: 1 MG/DL (ref 0.5–1)
EOSINOPHILS %: 1.52 (ref 0–10)
EOSINOPHILS ABSOLUTE: 0.06 (ref 0–1.1)
EPI CELLS #/AREA URNS HPF: NORMAL /HPF (ref 0–5)
GFR CALCULATED: > 60
GLOBULIN: 2.7 G/DL
GLUCOSE: 92 MG/DL (ref 65–105)
HCT VFR BLD CALC: 41.3 % (ref 37–47)
HDLC SERPL-MCNC: 97 MG/DL (ref 36–68)
HEMOGLOBIN: 13.4 (ref 12–16)
LDL CHOLESTEROL CALCULATED: 127.2 MG/DL (ref 0–160)
LYMPHOCYTE %: 30.93 (ref 20–51.1)
LYMPHOCYTES ABSOLUTE: 1.29 (ref 1–5.5)
MCH RBC QN AUTO: 29.8 PG (ref 28.5–32.5)
MCHC RBC AUTO-ENTMCNC: 32.4 G/DL (ref 32–37)
MCV RBC AUTO: 92.1 FL (ref 80–94)
MONOCYTES %: 7.14 (ref 1.7–9.3)
MONOCYTES ABSOLUTE: 0.3 (ref 0.1–1)
NEUTROPHILS %: 58.65 (ref 42.2–75.2)
NEUTROPHILS ABSOLUTE: 2.45 (ref 2–8.1)
PDW BLD-RTO: 11.5 % (ref 8.5–15.5)
PLATELET # BLD: 272.9 THOU/MM3 (ref 130–400)
POTASSIUM SERPL-SCNC: 3.9 MMOL/L (ref 3.6–5)
RBC #/AREA URNS HPF: NORMAL /HPF (ref 0–4)
RBC: 4.49 M/UL (ref 4.2–5.4)
SODIUM BLD-SCNC: 139 MMOL/L (ref 135–145)
TOTAL PROTEIN, SERUM: 7.7 G/DL (ref 6.3–8.2)
TRIGL SERPL-MCNC: 89 MG/DL (ref 10–250)
VLDLC SERPL CALC-MCNC: 18 MG/DL (ref 0–50)
WBC #/AREA URNS HPF: NORMAL /HPF (ref 0–4)
WBC: 4.2 THOU/ML3 (ref 4.8–10.8)

## 2024-02-06 PROCEDURE — 81015 MICROSCOPIC EXAM OF URINE: CPT

## 2024-02-06 PROCEDURE — 99396 PREV VISIT EST AGE 40-64: CPT | Performed by: NURSE PRACTITIONER

## 2024-02-06 ASSESSMENT — PATIENT HEALTH QUESTIONNAIRE - PHQ9
1. LITTLE INTEREST OR PLEASURE IN DOING THINGS: 0
SUM OF ALL RESPONSES TO PHQ QUESTIONS 1-9: 0
2. FEELING DOWN, DEPRESSED OR HOPELESS: 0
SUM OF ALL RESPONSES TO PHQ QUESTIONS 1-9: 0
SUM OF ALL RESPONSES TO PHQ QUESTIONS 1-9: 0
SUM OF ALL RESPONSES TO PHQ9 QUESTIONS 1 & 2: 0
SUM OF ALL RESPONSES TO PHQ QUESTIONS 1-9: 0

## 2024-02-06 NOTE — PATIENT INSTRUCTIONS
Well Visit, Women 50 to 65: Care Instructions  Overview     Well visits can help you stay healthy. Your doctor has checked your overall health and may have suggested ways to take good care of yourself. Your doctor also may have recommended tests. At home, you can help prevent illness with healthy eating, regular exercise, and other steps.  Follow-up care is a key part of your treatment and safety. Be sure to make and go to all appointments, and call your doctor if you are having problems. It's also a good idea to know your test results and keep a list of the medicines you take.  How can you care for yourself at home?  Get screening tests that you and your doctor decide on. Screening helps find diseases before any symptoms appear.  Eat healthy foods. Choose fruits, vegetables, whole grains, protein, and low-fat dairy foods. Limit fat, especially saturated fat. Reduce salt in your diet.  Limit alcohol. Have no more than 1 drink a day or 7 drinks a week.  Get at least 30 minutes of exercise on most days of the week. Walking is a good choice. You also may want to do other activities, such as running, swimming, cycling, or playing tennis or team sports.  Reach and stay at a healthy weight. This will lower your risk for many problems, such as obesity, diabetes, heart disease, and high blood pressure.  Do not smoke. Smoking can make health problems worse. If you need help quitting, talk to your doctor about stop-smoking programs and medicines. These can increase your chances of quitting for good.  Care for your mental health. It is easy to get weighed down by worry and stress. Learn strategies to manage stress, like deep breathing and mindfulness, and stay connected with your family and community. If you find you often feel sad or hopeless, talk with your doctor. Treatment can help.  Talk to your doctor about whether you have any risk factors for sexually transmitted infections (STIs). You can help prevent STIs if you  Received request via: Patient    Was the patient seen in the last year in this department? Yes    Does the patient have an active prescription (recently filled or refills available) for medication(s) requested? Yes

## 2024-02-06 NOTE — PROGRESS NOTES
Bina Alex, APRN-CNP  [place] [id number]  [phone]  Well Adult Note  Name: Pedro Luis Robertson Today’s Date: 2024   MRN: [id number] Sex: Female   Age: 53 y.o. Ethnicity: Non- / Non    : 1970 Race: White (non-)      Pedro Luis Robertson is here for well adult exam.  History:  Sinuses have improved with stopping milk ingestion.  The 10-year ASCVD risk score (Fili RODRIGUEZ, et al., 2019) is: 1.9%    Values used to calculate the score:      Age: 53 years      Sex: Female      Is Non- : No      Diabetic: No      Tobacco smoker: No      Systolic Blood Pressure: 122 mmHg      Is BP treated: No      HDL Cholesterol: 56 mg/dL      Total Cholesterol: 233 mg/dL      Review of Systems    Allergies   Allergen Reactions    Bee Pollen      Watering eyes and stuffy nose.    Gluten      Gas and bloating.    Gluten Meal Other (See Comments)     Gas and bloating.    Molds & Smuts      Watering eyes and stuffy nose.      Mucinex [Guaifenesin Er] Other (See Comments)     Made kidney area tender painful    Pollen Extract Other (See Comments)     Watering eyes and stuffy nose.         Prior to Visit Medications    Medication Sig Taking? Authorizing Provider   COLLAGEN PO Take by mouth Yes Brent Ghosh MD   Probiotic Product (PROBIOTIC-10 PO) Take by mouth Yes Brent Ghosh MD   Multiple Vitamins-Minerals (THERAPEUTIC MULTIVITAMIN-MINERALS) tablet Take 1 tablet by mouth daily \"Doctors' Choice\" Yes Brent Ghosh MD         Past Medical History:   Diagnosis Date    Encounter for cosmetic surgery 10/20/2015    History of UTI 2015    Other plastic surgery for unacceptable cosmetic appearance 2014    Bilateral otoplasty and rhinoplasty. Dr. ELIER Thompson.    Shortness of breath on exertion     Patient states she will \"feel a little dizzy at the top of the stairs.\"    Sinus drainage 2016    Sinus

## 2024-02-06 NOTE — RESULT ENCOUNTER NOTE
Please let Pedro Luis know her cardiac risk index is 1% which continues to look excellent.  Always a good idea to limit fats and exercise 150 minutes per week.   Kidney and liver function are normal.  White cells in her blood are mildly decreased.  No other abnormalities noted.  Has improved from last blood count 1 year ago.   The 10-year ASCVD risk score (Fili RODRIGUEZ, et al., 2019) is: 1.1%    Values used to calculate the score:      Age: 53 years      Sex: Female      Is Non- : No      Diabetic: No      Tobacco smoker: No      Systolic Blood Pressure: 122 mmHg      Is BP treated: No      HDL Cholesterol: 97 mg/dL      Total Cholesterol: 242 mg/dL

## 2024-02-07 DIAGNOSIS — R39.89 SENSATION OF PRESSURE IN BLADDER AREA: Primary | ICD-10-CM

## 2024-02-07 PROCEDURE — 81002 URINALYSIS NONAUTO W/O SCOPE: CPT | Performed by: NURSE PRACTITIONER

## 2024-02-08 ENCOUNTER — TELEPHONE (OUTPATIENT)
Dept: FAMILY MEDICINE CLINIC | Age: 54
End: 2024-02-08

## 2024-02-08 ENCOUNTER — HOSPITAL ENCOUNTER (OUTPATIENT)
Age: 54
Setting detail: SPECIMEN
Discharge: HOME | End: 2024-02-08
Payer: COMMERCIAL

## 2024-02-08 DIAGNOSIS — R31.9 HEMATURIA, UNSPECIFIED TYPE: Primary | ICD-10-CM

## 2024-02-08 DIAGNOSIS — R31.9 HEMATURIA, UNSPECIFIED TYPE: ICD-10-CM

## 2024-02-08 LAB
BILIRUBIN, POC: NEGATIVE
BLOOD URINE, POC: NORMAL
CLARITY, POC: CLEAR
COLOR, POC: YELLOW
GLUCOSE URINE, POC: NEGATIVE
KETONES, POC: NEGATIVE
LEUKOCYTE EST, POC: NEGATIVE
NITRITE, POC: NEGATIVE
PH, POC: 7
PROTEIN, POC: NEGATIVE
SPECIFIC GRAVITY, POC: 1.01
UROBILINOGEN, POC: 0.2

## 2024-02-08 PROCEDURE — 87086 URINE CULTURE/COLONY COUNT: CPT

## 2024-02-09 LAB
MICROORGANISM SPEC CULT: NO GROWTH
SPECIMEN DESCRIPTION: NORMAL

## 2024-02-09 NOTE — TELEPHONE ENCOUNTER
Please inform patient urine was sent for culture.  Questionable trace of blood.  Will call results of culture when obtained.  Suggest cranberry juice or tablets to lower pH.

## 2024-02-13 ENCOUNTER — OFFICE VISIT (OUTPATIENT)
Dept: FAMILY MEDICINE CLINIC | Age: 54
End: 2024-02-13
Payer: COMMERCIAL

## 2024-02-13 VITALS
TEMPERATURE: 98.3 F | WEIGHT: 132.4 LBS | RESPIRATION RATE: 16 BRPM | BODY MASS INDEX: 24.84 KG/M2 | DIASTOLIC BLOOD PRESSURE: 72 MMHG | HEART RATE: 88 BPM | OXYGEN SATURATION: 100 % | SYSTOLIC BLOOD PRESSURE: 118 MMHG

## 2024-02-13 DIAGNOSIS — R25.2 LEG CRAMPS: ICD-10-CM

## 2024-02-13 DIAGNOSIS — L43.9 LICHEN PLANUS OF TONGUE: Primary | ICD-10-CM

## 2024-02-13 DIAGNOSIS — R35.0 INCREASED URINARY FREQUENCY: ICD-10-CM

## 2024-02-13 LAB
ANION GAP SERPL CALCULATED.3IONS-SCNC: 7.8 MMOL/L (ref 3–11)
BUN BLDV-MCNC: 13 MG/DL (ref 7–17)
CALCIUM SERPL-MCNC: 10.5 MG/DL (ref 8.4–10.2)
CHLORIDE BLD-SCNC: 102 MMOL/L (ref 98–120)
CO2: 30 MMOL/L (ref 22–31)
CREAT SERPL-MCNC: 0.9 MG/DL (ref 0.5–1)
GFR CALCULATED: > 60
GLUCOSE: 113 MG/DL (ref 65–105)
MAGNESIUM: 2.1 MG/DL (ref 1.6–2.3)
POTASSIUM SERPL-SCNC: 3.8 MMOL/L (ref 3.6–5)
SODIUM BLD-SCNC: 140 MMOL/L (ref 135–145)

## 2024-02-13 PROCEDURE — 99213 OFFICE O/P EST LOW 20 MIN: CPT | Performed by: NURSE PRACTITIONER

## 2024-02-13 RX ORDER — POLYETHYLENE GLYCOL 3350 17 G/17G
17 POWDER, FOR SOLUTION ORAL DAILY
Qty: 510 G | Refills: 0 | Status: SHIPPED | OUTPATIENT
Start: 2024-02-13 | End: 2024-03-14

## 2024-02-13 RX ORDER — FLUCONAZOLE 150 MG/1
150 TABLET ORAL WEEKLY
Qty: 4 TABLET | Refills: 0 | Status: SHIPPED | OUTPATIENT
Start: 2024-02-13 | End: 2024-03-14

## 2024-02-13 RX ORDER — CLOBETASOL PROPIONATE 0.5 MG/G
OINTMENT TOPICAL 4 TIMES DAILY
Qty: 60 G | Refills: 0 | Status: SHIPPED | OUTPATIENT
Start: 2024-02-13

## 2024-02-13 NOTE — PATIENT INSTRUCTIONS
Dry tongue with guaze.  Apply ointment.  Do not talk, eat or drink for 30 minutes after application.  Do this 4 times a day.  May decrease to 3 times a day once swelling is controlled.  After 3 days decrease to twice daily and 3 days later to 1 time daily for 3 days and then stop.  If lesion does not resolve in 1 month stop back for recheck.    Have magnesium level checked.    Diflucan 150 mg once weekly while on the oral steroid.    Several nonpharmacologic measures may help to reduce morbidity associated with oral LP. Exposure to factors that may exacerbate the disease should be minimized. We typically encourage the following:  ?Maintenance of good oral hygiene (brushing twice daily, flossing daily, professional dental cleaning every three to four months)  ?Elimination of mechanical irritation from dental restorations, dental appliances, or sharp or malaligned teeth  ?Avoidance of habits that cause trauma (eg, chewing on lips or mucosa)  ?Cessation of smoking or use of smokeless tobacco  ?Reduced consumption of acidic, salty, spicy, hot, sharp, or rough foods

## 2024-02-13 NOTE — RESULT ENCOUNTER NOTE
Please let Pedro Luis know her Magnesium is normal.  She can try some stretching exercises of the lower legs.  Also recommend taking a Vitamin B complex if she doesn't at this time.  It can be in addition to her regular vitamin.  Give this 1 month.  If no improvement let me know.

## 2024-02-13 NOTE — PROGRESS NOTES
Bina Alex, APRN-CNP  [place] [id number]  [phone]        2/13/2024     Pedro Luis Robertson is a 53 y.o. female, here for evaluation of the following medical concerns:    Chief Complaint   Patient presents with    Oral Swelling     Left side of tongue swollen, metal tasting. White coating.  Sx started 2/9/24. She reports having charley horses in both legs last night.         HPI  Pedro Luis reports having severe charley horses last night in the posterior calves bilaterally.  She also notes starting February 9 that the left side of her tongue began to swell, had some white thick stuff on it, and noted a metallic taste.  The lesion does not hurt.  Patient just had a BMP cardiac profile liver profile and CBC completed on February 6.  These were all normal.  The patient does not recall injuring her tongue.  Patient Active Problem List   Diagnosis    Venous insufficiency       Patient's recent lab reports are as follows:      Results for orders placed or performed during the hospital encounter of 02/08/24   Culture, Urine    Specimen: Urine, clean catch   Result Value Ref Range    Specimen Description .CLEAN CATCH URINE     Culture NO GROWTH      Other review of systems are as noted below.    Preventative measures are reviewed today. See health maintenance section for complete preventative plan of care.    Did review patient's med list, allergies, social history, fam history, pmhx and pshx today as noted in the record.      Review of Systems   Constitutional:  Negative for chills, fatigue and fever.   HENT:  Positive for mouth sores.         Lesion of the left lateral tongue   Musculoskeletal:  Positive for myalgias.        Bilateral calves         Prior to Visit Medications    Medication Sig Taking? Authorizing Provider   CRANBERRY PO Take by mouth Yes Provider, MD Brent   polyethylene glycol (GLYCOLAX) 17 GM/SCOOP powder Take 17 g by mouth daily Yes Isai

## 2024-02-26 ENCOUNTER — TELEPHONE (OUTPATIENT)
Dept: FAMILY MEDICINE CLINIC | Age: 54
End: 2024-02-26

## 2024-02-26 DIAGNOSIS — R35.0 INCREASED URINARY FREQUENCY: Primary | ICD-10-CM

## 2024-02-26 DIAGNOSIS — R30.0 DYSURIA: ICD-10-CM

## 2024-02-26 NOTE — TELEPHONE ENCOUNTER
Pt called requesting a referral to urology. Pt stated that she is having issues with her bladder. Pt is requesting to be seen with Zulma Wilson at [facility]. Please advise.

## 2024-10-31 SDOH — HEALTH STABILITY: PHYSICAL HEALTH: ON AVERAGE, HOW MANY MINUTES DO YOU ENGAGE IN EXERCISE AT THIS LEVEL?: 20 MIN

## 2024-10-31 SDOH — HEALTH STABILITY: PHYSICAL HEALTH: ON AVERAGE, HOW MANY DAYS PER WEEK DO YOU ENGAGE IN MODERATE TO STRENUOUS EXERCISE (LIKE A BRISK WALK)?: 6 DAYS

## 2024-11-01 ENCOUNTER — OFFICE VISIT (OUTPATIENT)
Dept: FAMILY MEDICINE CLINIC | Age: 54
End: 2024-11-01
Payer: COMMERCIAL

## 2024-11-01 VITALS
HEART RATE: 78 BPM | SYSTOLIC BLOOD PRESSURE: 126 MMHG | BODY MASS INDEX: 24.87 KG/M2 | WEIGHT: 132.6 LBS | OXYGEN SATURATION: 99 % | DIASTOLIC BLOOD PRESSURE: 80 MMHG

## 2024-11-01 DIAGNOSIS — Z13.6 ENCOUNTER FOR LIPID SCREENING FOR CARDIOVASCULAR DISEASE: ICD-10-CM

## 2024-11-01 DIAGNOSIS — Z00.01 ENCOUNTER FOR GENERAL ADULT MEDICAL EXAMINATION WITH ABNORMAL FINDINGS: Primary | ICD-10-CM

## 2024-11-01 DIAGNOSIS — S82.892D CLOSED FRACTURE OF LEFT ANKLE WITH ROUTINE HEALING, SUBSEQUENT ENCOUNTER: ICD-10-CM

## 2024-11-01 DIAGNOSIS — Z13.220 ENCOUNTER FOR LIPID SCREENING FOR CARDIOVASCULAR DISEASE: ICD-10-CM

## 2024-11-01 LAB
ALBUMIN/GLOBULIN RATIO: 1.7 G/DL
ALBUMIN: 5 G/DL (ref 3.5–5)
ALP BLD-CCNC: 125 UNITS/L (ref 38–126)
ALT SERPL-CCNC: 20 UNITS/L (ref 4–35)
ANION GAP SERPL CALCULATED.3IONS-SCNC: 8.6 MMOL/L (ref 3–11)
AST SERPL-CCNC: 31 UNITS/L (ref 14–36)
BASOPHILS ABSOLUTE: 0.07 X10E3/?L (ref 0–0.3)
BASOPHILS RELATIVE PERCENT: 1.4 % (ref 0–3)
BILIRUB SERPL-MCNC: 0.9 MG/DL (ref 0.2–1.3)
BUN BLDV-MCNC: 11 MG/DL (ref 7–17)
CALCIUM SERPL-MCNC: 9.9 MG/DL (ref 8.4–10.2)
CHLORIDE BLD-SCNC: 101 MMOL/L (ref 98–120)
CHOLESTEROL, TOTAL: 245 MG/DL (ref 50–200)
CHOLESTEROL/HDL RATIO: 2.5 RATIO (ref 0–4.5)
CO2: 27 MMOL/L (ref 22–31)
CREAT SERPL-MCNC: 0.7 MG/DL (ref 0.5–1)
EOSINOPHILS ABSOLUTE: 0.09 X10E3/?L (ref 0–1.1)
EOSINOPHILS RELATIVE PERCENT: 1.77 % (ref 0–10)
GFR, ESTIMATED: > 60
GLOBULIN: 3 G/DL
GLUCOSE: 83 MG/DL (ref 65–105)
HCT VFR BLD CALC: 43.8 % (ref 37–47)
HDLC SERPL-MCNC: 98 MG/DL (ref 36–68)
HEMOGLOBIN: 13.9 G/DL (ref 12–16)
LDL CHOLESTEROL: 131.4 MG/DL (ref 0–160)
LYMPHOCYTES ABSOLUTE: 1.46 X10E3/?L (ref 1–5.5)
LYMPHOCYTES RELATIVE PERCENT: 29.13 % (ref 20–51.1)
MCH RBC QN AUTO: 30.1 PG (ref 28.5–32.5)
MCHC RBC AUTO-ENTMCNC: 31.8 G/DL (ref 32–37)
MCV RBC AUTO: 94.8 FL (ref 80–94)
MONOCYTES ABSOLUTE: 0.33 X10E3/?L (ref 0.1–1)
MONOCYTES RELATIVE PERCENT: 6.6 % (ref 1.7–9.3)
NEUTROPHILS ABSOLUTE: 3.05 X10E3/?L (ref 2–8.1)
NEUTROPHILS RELATIVE PERCENT: 61.1 % (ref 42.2–75.2)
PDW BLD-RTO: 10.9 % (ref 8.5–15.5)
PLATELET # BLD: 278.5 THOU/MM3 (ref 130–400)
POTASSIUM SERPL-SCNC: 3.6 MMOL/L (ref 3.6–5)
RBC # BLD: 4.62 M/UL (ref 4.2–5.4)
SODIUM BLD-SCNC: 137 MMOL/L (ref 135–145)
TOTAL PROTEIN: 8 G/DL (ref 6.3–8.2)
TRIGL SERPL-MCNC: 78 MG/DL (ref 10–250)
TSH REFLEX FT4: 1.44 MIU/ML (ref 0.49–4.67)
VLDLC SERPL CALC-MCNC: 16 MG/DL (ref 0–50)
WBC # BLD: 5 THOU/ML3 (ref 4.8–10.8)

## 2024-11-01 PROCEDURE — 99396 PREV VISIT EST AGE 40-64: CPT | Performed by: STUDENT IN AN ORGANIZED HEALTH CARE EDUCATION/TRAINING PROGRAM

## 2024-11-01 NOTE — PROGRESS NOTES
no abdominal tenderness.   Musculoskeletal:         General: Deformity (Left fracture boot overlying left ankle fracture) present.      Cervical back: Normal range of motion and neck supple.      Right lower leg: No edema.      Left lower leg: No edema.   Skin:     General: Skin is warm and dry.      Findings: No lesion.   Neurological:      General: No focal deficit present.      Mental Status: She is alert and oriented to person, place, and time.   Psychiatric:         Mood and Affect: Mood normal.         Behavior: Behavior normal.           Please be aware portions of this note were completed using voice recognition software and unforeseen errors may have occurred    I personally reviewed the patient's past medical history, current medications, allergies, surgical history, family history and social history.  Updates were made as necessary.    Electronically signed by Michel Guevara MD on 11/2/2024 at 10:39 PM